# Patient Record
(demographics unavailable — no encounter records)

---

## 2025-07-25 NOTE — HISTORY OF PRESENT ILLNESS
[de-identified] : Patient is a 78-year-old female here for evaluation bilateral knees.  Patient has long history of bilateral knee pain/osteoarthritis has been treated in the past with injections, physical therapy, medication with no relief pain worsens her activities uses walker for ambulation  Bilateral knee exam: No effusion no ecchymosis no erythema joint line tenderness range of motion 0 degrees to 110 degrees with discomfort guarding no gross instability neurovascular intact calf soft nontender  X-ray bilateral knees 3 views: No acute fractures, subluxations, dislocations advanced tricompartmental osteoarthritis bilaterally  Discussed with patient detail she has advanced osteoarthritis bilateral knees.  Discussed treatment options detail acute and conservative versus surgical.  Patient has already failed conservative treatment at this point.  Advised weight loss and follow-up for consult total knee replacement.  Patient interested in total knee replacement.  Patient understands Riese plan